# Patient Record
Sex: FEMALE | Race: WHITE | NOT HISPANIC OR LATINO | Employment: FULL TIME | URBAN - NONMETROPOLITAN AREA
[De-identification: names, ages, dates, MRNs, and addresses within clinical notes are randomized per-mention and may not be internally consistent; named-entity substitution may affect disease eponyms.]

---

## 2023-08-31 PROBLEM — J45.20 MILD INTERMITTENT ASTHMA WITHOUT COMPLICATION (HHS-HCC): Status: ACTIVE | Noted: 2023-08-31

## 2023-08-31 PROBLEM — I10 BENIGN HYPERTENSION: Status: ACTIVE | Noted: 2023-08-31

## 2023-08-31 PROBLEM — E66.9 OBESITY: Status: ACTIVE | Noted: 2023-08-31

## 2023-08-31 PROBLEM — G47.10 HYPERSOMNIA: Status: ACTIVE | Noted: 2023-08-31

## 2023-08-31 PROBLEM — I80.202: Status: ACTIVE | Noted: 2023-08-31

## 2023-08-31 PROBLEM — G47.19 EXCESSIVE DAYTIME SLEEPINESS: Status: ACTIVE | Noted: 2023-08-31

## 2023-08-31 PROBLEM — R13.10 DYSPHAGIA: Status: ACTIVE | Noted: 2023-08-31

## 2023-08-31 PROBLEM — R73.9 HYPERGLYCEMIA: Status: ACTIVE | Noted: 2023-08-31

## 2023-08-31 PROBLEM — F32.A DEPRESSION: Status: ACTIVE | Noted: 2023-08-31

## 2023-08-31 PROBLEM — M17.0 PRIMARY OSTEOARTHRITIS OF BOTH KNEES: Status: ACTIVE | Noted: 2023-08-31

## 2023-08-31 PROBLEM — E55.9 VITAMIN D DEFICIENCY: Status: ACTIVE | Noted: 2023-08-31

## 2023-08-31 PROBLEM — E78.5 HYPERLIPIDEMIA: Status: ACTIVE | Noted: 2023-08-31

## 2023-08-31 PROBLEM — K64.4 EXTERNAL HEMORRHOIDS WITH COMPLICATION: Status: ACTIVE | Noted: 2023-08-31

## 2023-08-31 PROBLEM — R92.8 ABNORMAL MAMMOGRAM: Status: ACTIVE | Noted: 2023-08-31

## 2023-08-31 PROBLEM — J45.909 ASTHMA WITHOUT STATUS ASTHMATICUS (HHS-HCC): Status: ACTIVE | Noted: 2023-08-31

## 2023-08-31 PROBLEM — D68.59 HYPERCOAGULABLE STATE (MULTI): Status: ACTIVE | Noted: 2023-08-31

## 2023-08-31 PROBLEM — Z98.890 HISTORY OF HEMORRHOIDECTOMY: Status: ACTIVE | Noted: 2023-08-31

## 2023-08-31 PROBLEM — K64.5 THROMBOSED EXTERNAL HEMORRHOID: Status: ACTIVE | Noted: 2023-08-31

## 2023-08-31 PROBLEM — K21.9 GASTROESOPHAGEAL REFLUX DISEASE WITHOUT ESOPHAGITIS: Status: ACTIVE | Noted: 2023-08-31

## 2023-08-31 PROBLEM — G25.81 RESTLESS LEG: Status: ACTIVE | Noted: 2023-08-31

## 2023-08-31 PROBLEM — G89.29 OTHER CHRONIC PAIN: Status: ACTIVE | Noted: 2023-08-31

## 2023-08-31 PROBLEM — M17.9 OSTEOARTHRITIS OF KNEE: Status: ACTIVE | Noted: 2023-08-31

## 2023-08-31 PROBLEM — S61.219A LACERATION OF FINGER: Status: ACTIVE | Noted: 2023-08-31

## 2023-08-31 PROBLEM — G47.33 OSA (OBSTRUCTIVE SLEEP APNEA): Status: ACTIVE | Noted: 2023-08-31

## 2023-08-31 PROBLEM — R89.9 ABNORMAL LABORATORY TEST RESULT: Status: ACTIVE | Noted: 2023-08-31

## 2023-08-31 PROBLEM — G47.8 SLEEP TALKING: Status: ACTIVE | Noted: 2023-08-31

## 2023-08-31 PROBLEM — R53.82 CHRONIC FATIGUE: Status: ACTIVE | Noted: 2023-08-31

## 2023-08-31 RX ORDER — DULOXETIN HYDROCHLORIDE 30 MG/1
1 CAPSULE, DELAYED RELEASE ORAL
COMMUNITY
Start: 2022-07-28

## 2023-08-31 RX ORDER — LANOLIN ALCOHOL/MO/W.PET/CERES
500 CREAM (GRAM) TOPICAL DAILY
COMMUNITY
End: 2024-03-19 | Stop reason: ALTCHOICE

## 2023-08-31 RX ORDER — FUROSEMIDE 20 MG/1
20 TABLET ORAL DAILY PRN
COMMUNITY
Start: 2022-10-25 | End: 2023-11-01

## 2023-08-31 RX ORDER — FAMOTIDINE 40 MG/1
40 TABLET, FILM COATED ORAL DAILY
COMMUNITY
Start: 2023-05-23 | End: 2024-04-24 | Stop reason: SDUPTHER

## 2023-08-31 RX ORDER — ROPINIROLE 1 MG/1
1 TABLET, FILM COATED ORAL 2 TIMES DAILY
COMMUNITY
End: 2024-05-07

## 2023-08-31 RX ORDER — ERGOCALCIFEROL 1.25 MG/1
50000 CAPSULE ORAL 2 TIMES WEEKLY
COMMUNITY
End: 2023-10-30

## 2023-08-31 RX ORDER — ALBUTEROL SULFATE 90 UG/1
2 AEROSOL, METERED RESPIRATORY (INHALATION) EVERY 4 HOURS PRN
COMMUNITY
End: 2023-11-01

## 2023-08-31 RX ORDER — ACETAMINOPHEN 500 MG
1000 TABLET ORAL 2 TIMES DAILY
COMMUNITY
Start: 2022-04-28

## 2023-08-31 RX ORDER — LOSARTAN POTASSIUM AND HYDROCHLOROTHIAZIDE 25; 100 MG/1; MG/1
1 TABLET ORAL DAILY
COMMUNITY
Start: 2022-08-23

## 2023-08-31 RX ORDER — CELECOXIB 200 MG/1
200 CAPSULE ORAL
COMMUNITY
Start: 2022-07-28 | End: 2024-03-21

## 2023-10-30 DIAGNOSIS — E55.9 VITAMIN D DEFICIENCY: ICD-10-CM

## 2023-10-30 RX ORDER — OMEPRAZOLE 40 MG/1
CAPSULE, DELAYED RELEASE ORAL
COMMUNITY
Start: 2023-01-30 | End: 2024-01-12 | Stop reason: SDUPTHER

## 2023-10-30 RX ORDER — HYDROCODONE BITARTRATE AND ACETAMINOPHEN 5; 325 MG/1; MG/1
TABLET ORAL
COMMUNITY
Start: 2022-12-05 | End: 2024-03-19 | Stop reason: ALTCHOICE

## 2023-10-30 RX ORDER — APIXABAN 5 MG/1
5 TABLET, FILM COATED ORAL 2 TIMES DAILY
COMMUNITY
Start: 2023-08-09

## 2023-10-30 RX ORDER — ERGOCALCIFEROL 1.25 MG/1
CAPSULE ORAL
Qty: 26 CAPSULE | Refills: 3 | Status: SHIPPED | OUTPATIENT
Start: 2023-10-30

## 2023-10-30 RX ORDER — CELECOXIB 100 MG/1
CAPSULE ORAL
COMMUNITY
Start: 2023-04-29 | End: 2024-03-19 | Stop reason: ALTCHOICE

## 2023-11-01 DIAGNOSIS — I10 BENIGN HYPERTENSION: Primary | ICD-10-CM

## 2023-11-01 DIAGNOSIS — J45.20 MILD INTERMITTENT ASTHMA, UNSPECIFIED WHETHER COMPLICATED (HHS-HCC): ICD-10-CM

## 2023-11-01 DIAGNOSIS — F32.A DEPRESSION, UNSPECIFIED DEPRESSION TYPE: ICD-10-CM

## 2023-11-01 RX ORDER — ALBUTEROL SULFATE 90 UG/1
2 AEROSOL, METERED RESPIRATORY (INHALATION) EVERY 4 HOURS PRN
Qty: 25.5 G | Refills: 3 | Status: SHIPPED | OUTPATIENT
Start: 2023-11-01

## 2023-11-01 RX ORDER — FLUOXETINE HYDROCHLORIDE 20 MG/1
20 CAPSULE ORAL 3 TIMES DAILY
Qty: 270 CAPSULE | Refills: 3 | Status: SHIPPED | OUTPATIENT
Start: 2023-11-01

## 2023-11-01 RX ORDER — FUROSEMIDE 20 MG/1
20 TABLET ORAL DAILY PRN
Qty: 90 TABLET | Refills: 3 | Status: SHIPPED | OUTPATIENT
Start: 2023-11-01

## 2023-11-28 ENCOUNTER — APPOINTMENT (OUTPATIENT)
Dept: PRIMARY CARE | Facility: CLINIC | Age: 63
End: 2023-11-28
Payer: COMMERCIAL

## 2023-12-26 ENCOUNTER — APPOINTMENT (OUTPATIENT)
Dept: PRIMARY CARE | Facility: CLINIC | Age: 63
End: 2023-12-26
Payer: COMMERCIAL

## 2024-01-12 DIAGNOSIS — K21.9 GASTROESOPHAGEAL REFLUX DISEASE WITHOUT ESOPHAGITIS: ICD-10-CM

## 2024-01-12 RX ORDER — OMEPRAZOLE 40 MG/1
CAPSULE, DELAYED RELEASE ORAL
Qty: 90 CAPSULE | Refills: 3 | Status: SHIPPED | OUTPATIENT
Start: 2024-01-12 | End: 2024-05-29 | Stop reason: WASHOUT

## 2024-03-19 ENCOUNTER — OFFICE VISIT (OUTPATIENT)
Dept: PRIMARY CARE | Facility: CLINIC | Age: 64
End: 2024-03-19
Payer: COMMERCIAL

## 2024-03-19 VITALS
HEIGHT: 64 IN | SYSTOLIC BLOOD PRESSURE: 130 MMHG | OXYGEN SATURATION: 99 % | HEART RATE: 95 BPM | DIASTOLIC BLOOD PRESSURE: 88 MMHG | BODY MASS INDEX: 50.02 KG/M2 | TEMPERATURE: 98.1 F | WEIGHT: 293 LBS

## 2024-03-19 DIAGNOSIS — R73.9 HYPERGLYCEMIA: ICD-10-CM

## 2024-03-19 DIAGNOSIS — Z12.31 BREAST CANCER SCREENING BY MAMMOGRAM: ICD-10-CM

## 2024-03-19 DIAGNOSIS — E66.01 MORBID OBESITY WITH BMI OF 50.0-59.9, ADULT (MULTI): Primary | ICD-10-CM

## 2024-03-19 LAB — POC HEMOGLOBIN A1C: 5.7 % (ref 4.2–6.5)

## 2024-03-19 PROCEDURE — 1036F TOBACCO NON-USER: CPT | Performed by: FAMILY MEDICINE

## 2024-03-19 PROCEDURE — 83036 HEMOGLOBIN GLYCOSYLATED A1C: CPT | Performed by: FAMILY MEDICINE

## 2024-03-19 PROCEDURE — 3079F DIAST BP 80-89 MM HG: CPT | Performed by: FAMILY MEDICINE

## 2024-03-19 PROCEDURE — 3075F SYST BP GE 130 - 139MM HG: CPT | Performed by: FAMILY MEDICINE

## 2024-03-19 PROCEDURE — 3008F BODY MASS INDEX DOCD: CPT | Performed by: FAMILY MEDICINE

## 2024-03-19 PROCEDURE — 99214 OFFICE O/P EST MOD 30 MIN: CPT | Performed by: FAMILY MEDICINE

## 2024-03-19 RX ORDER — TIRZEPATIDE 5 MG/.5ML
5 INJECTION, SOLUTION SUBCUTANEOUS
Qty: 2 ML | Refills: 3 | Status: SHIPPED | OUTPATIENT
Start: 2024-03-19 | End: 2024-05-29

## 2024-03-19 ASSESSMENT — PATIENT HEALTH QUESTIONNAIRE - PHQ9
1. LITTLE INTEREST OR PLEASURE IN DOING THINGS: NOT AT ALL
SUM OF ALL RESPONSES TO PHQ9 QUESTIONS 1 AND 2: 0
2. FEELING DOWN, DEPRESSED OR HOPELESS: NOT AT ALL

## 2024-03-19 ASSESSMENT — ENCOUNTER SYMPTOMS
CONFUSION: 0
AGITATION: 0
APPETITE CHANGE: 1
FATIGUE: 1
DYSPHORIC MOOD: 0

## 2024-03-19 ASSESSMENT — PAIN SCALES - GENERAL: PAINLEVEL: 10-WORST PAIN EVER

## 2024-03-19 ASSESSMENT — LIFESTYLE VARIABLES: HOW OFTEN DO YOU HAVE A DRINK CONTAINING ALCOHOL: NEVER

## 2024-03-19 NOTE — PROGRESS NOTES
"Subjective   Patient ID: Yazmin Slade is a 63 y.o. female who presents for would like to discuss gastric bypass options.    HPI wants to discuss surgery options, has clotting disorder.     Review of Systems   Constitutional:  Positive for appetite change and fatigue.   Psychiatric/Behavioral:  Negative for agitation, confusion and dysphoric mood.        Objective   /88   Pulse 95   Temp 36.7 °C (98.1 °F)   Ht 1.626 m (5' 4\")   Wt 138 kg (305 lb)   SpO2 99%   BMI 52.35 kg/m²     Physical Exam  Constitutional:       General: She is not in acute distress.     Appearance: Normal appearance.   HENT:      Head: Normocephalic.      Mouth/Throat:      Mouth: Mucous membranes are moist.      Pharynx: Oropharynx is clear.   Eyes:      Extraocular Movements: Extraocular movements intact.      Pupils: Pupils are equal, round, and reactive to light.   Cardiovascular:      Rate and Rhythm: Normal rate and regular rhythm.      Heart sounds: Normal heart sounds.   Pulmonary:      Effort: Pulmonary effort is normal.      Breath sounds: Normal breath sounds. No wheezing or rhonchi.   Abdominal:      General: There is no distension.      Palpations: Abdomen is soft.      Tenderness: There is no abdominal tenderness.   Musculoskeletal:      Right lower leg: No edema.      Left lower leg: No edema.   Lymphadenopathy:      Cervical: No cervical adenopathy.   Skin:     Coloration: Skin is not jaundiced.   Neurological:      General: No focal deficit present.      Mental Status: She is alert.      Cranial Nerves: No cranial nerve deficit.   Psychiatric:         Mood and Affect: Mood normal.         Assessment/Plan   Problem List Items Addressed This Visit             ICD-10-CM    Hyperglycemia R73.9    Relevant Medications    tirzepatide (Mounjaro) 5 mg/0.5 mL pen injector    Other Relevant Orders    POCT glycosylated hemoglobin (Hb A1C) manually resulted (Completed)    Referral to General Surgery     Other Visit Diagnoses  "        Codes    Morbid obesity with BMI of 50.0-59.9, adult (CMS/HCC)    -  Primary E66.01, Z68.43    Relevant Medications    tirzepatide (Mounjaro) 5 mg/0.5 mL pen injector    Other Relevant Orders    POCT glycosylated hemoglobin (Hb A1C) manually resulted (Completed)    Referral to General Surgery    Breast cancer screening by mammogram     Z12.31    Relevant Orders    BI mammo bilateral screening tomosynthesis          Follow up as scheduled for physical- trial of med assist for weight loss

## 2024-03-20 DIAGNOSIS — M19.90 OSTEOARTHRITIS, UNSPECIFIED OSTEOARTHRITIS TYPE, UNSPECIFIED SITE: ICD-10-CM

## 2024-03-21 RX ORDER — CELECOXIB 200 MG/1
200 CAPSULE ORAL 2 TIMES DAILY
Qty: 180 CAPSULE | Refills: 3 | Status: SHIPPED | OUTPATIENT
Start: 2024-03-21 | End: 2025-03-21

## 2024-03-21 RX ORDER — CELECOXIB 200 MG/1
CAPSULE ORAL
Refills: 0 | OUTPATIENT
Start: 2024-03-21

## 2024-03-26 ENCOUNTER — OFFICE VISIT (OUTPATIENT)
Dept: PRIMARY CARE | Facility: CLINIC | Age: 64
End: 2024-03-26
Payer: COMMERCIAL

## 2024-03-26 VITALS
HEART RATE: 103 BPM | DIASTOLIC BLOOD PRESSURE: 86 MMHG | SYSTOLIC BLOOD PRESSURE: 138 MMHG | HEIGHT: 64 IN | WEIGHT: 293 LBS | TEMPERATURE: 98.3 F | OXYGEN SATURATION: 98 % | BODY MASS INDEX: 50.02 KG/M2

## 2024-03-26 DIAGNOSIS — E66.01 CLASS 3 SEVERE OBESITY DUE TO EXCESS CALORIES WITH SERIOUS COMORBIDITY AND BODY MASS INDEX (BMI) OF 50.0 TO 59.9 IN ADULT (MULTI): ICD-10-CM

## 2024-03-26 DIAGNOSIS — K21.9 GASTROESOPHAGEAL REFLUX DISEASE WITHOUT ESOPHAGITIS: ICD-10-CM

## 2024-03-26 DIAGNOSIS — Z00.00 ROUTINE GENERAL MEDICAL EXAMINATION AT A HEALTH CARE FACILITY: Primary | ICD-10-CM

## 2024-03-26 DIAGNOSIS — E78.2 MIXED HYPERLIPIDEMIA: ICD-10-CM

## 2024-03-26 DIAGNOSIS — G62.9 NEUROPATHY: ICD-10-CM

## 2024-03-26 DIAGNOSIS — R53.82 CHRONIC FATIGUE: ICD-10-CM

## 2024-03-26 DIAGNOSIS — M25.50 POLYARTHRALGIA: ICD-10-CM

## 2024-03-26 DIAGNOSIS — E55.9 VITAMIN D DEFICIENCY: ICD-10-CM

## 2024-03-26 DIAGNOSIS — I10 BENIGN HYPERTENSION: ICD-10-CM

## 2024-03-26 LAB
ALBUMIN SERPL BCP-MCNC: 3.5 G/DL (ref 3.4–5)
ALP SERPL-CCNC: 120 U/L (ref 33–136)
ALT SERPL W P-5'-P-CCNC: 21 U/L (ref 7–45)
ANION GAP SERPL CALC-SCNC: 14 MMOL/L (ref 10–20)
AST SERPL W P-5'-P-CCNC: 18 U/L (ref 9–39)
BASOPHILS # BLD AUTO: 0.07 X10*3/UL (ref 0–0.1)
BASOPHILS NFR BLD AUTO: 0.7 %
BILIRUB SERPL-MCNC: 0.3 MG/DL (ref 0–1.2)
BUN SERPL-MCNC: 20 MG/DL (ref 6–23)
CALCIUM SERPL-MCNC: 8.7 MG/DL (ref 8.6–10.3)
CHLORIDE SERPL-SCNC: 102 MMOL/L (ref 98–107)
CHOLEST SERPL-MCNC: 139 MG/DL (ref 0–199)
CHOLESTEROL/HDL RATIO: 3.9
CO2 SERPL-SCNC: 32 MMOL/L (ref 21–32)
CREAT SERPL-MCNC: 1.15 MG/DL (ref 0.5–1.05)
EGFRCR SERPLBLD CKD-EPI 2021: 54 ML/MIN/1.73M*2
EOSINOPHIL # BLD AUTO: 0.35 X10*3/UL (ref 0–0.7)
EOSINOPHIL NFR BLD AUTO: 3.7 %
ERYTHROCYTE [DISTWIDTH] IN BLOOD BY AUTOMATED COUNT: 14.5 % (ref 11.5–14.5)
GLUCOSE SERPL-MCNC: 113 MG/DL (ref 74–99)
HCT VFR BLD AUTO: 42 % (ref 36–46)
HDLC SERPL-MCNC: 35.4 MG/DL
HGB BLD-MCNC: 13.3 G/DL (ref 12–16)
IMM GRANULOCYTES # BLD AUTO: 0.04 X10*3/UL (ref 0–0.7)
IMM GRANULOCYTES NFR BLD AUTO: 0.4 % (ref 0–0.9)
LDLC SERPL CALC-MCNC: 74 MG/DL
LYMPHOCYTES # BLD AUTO: 2.32 X10*3/UL (ref 1.2–4.8)
LYMPHOCYTES NFR BLD AUTO: 24.7 %
MCH RBC QN AUTO: 26.9 PG (ref 26–34)
MCHC RBC AUTO-ENTMCNC: 31.7 G/DL (ref 32–36)
MCV RBC AUTO: 85 FL (ref 80–100)
MONOCYTES # BLD AUTO: 0.63 X10*3/UL (ref 0.1–1)
MONOCYTES NFR BLD AUTO: 6.7 %
NEUTROPHILS # BLD AUTO: 5.99 X10*3/UL (ref 1.2–7.7)
NEUTROPHILS NFR BLD AUTO: 63.8 %
NON HDL CHOLESTEROL: 104 MG/DL (ref 0–149)
NRBC BLD-RTO: 0 /100 WBCS (ref 0–0)
PLATELET # BLD AUTO: 322 X10*3/UL (ref 150–450)
POTASSIUM SERPL-SCNC: 3.6 MMOL/L (ref 3.5–5.3)
PROT SERPL-MCNC: 7.1 G/DL (ref 6.4–8.2)
RBC # BLD AUTO: 4.94 X10*6/UL (ref 4–5.2)
SODIUM SERPL-SCNC: 144 MMOL/L (ref 136–145)
T4 FREE SERPL-MCNC: 0.87 NG/DL (ref 0.61–1.12)
TRIGL SERPL-MCNC: 147 MG/DL (ref 0–149)
TSH SERPL-ACNC: 5.59 MIU/L (ref 0.44–3.98)
URATE SERPL-MCNC: 6.1 MG/DL (ref 2.3–6.7)
VLDL: 29 MG/DL (ref 0–40)
WBC # BLD AUTO: 9.4 X10*3/UL (ref 4.4–11.3)

## 2024-03-26 PROCEDURE — 84439 ASSAY OF FREE THYROXINE: CPT

## 2024-03-26 PROCEDURE — 1036F TOBACCO NON-USER: CPT | Performed by: FAMILY MEDICINE

## 2024-03-26 PROCEDURE — 80061 LIPID PANEL: CPT

## 2024-03-26 PROCEDURE — 82607 VITAMIN B-12: CPT

## 2024-03-26 PROCEDURE — 3079F DIAST BP 80-89 MM HG: CPT | Performed by: FAMILY MEDICINE

## 2024-03-26 PROCEDURE — 36415 COLL VENOUS BLD VENIPUNCTURE: CPT

## 2024-03-26 PROCEDURE — 3008F BODY MASS INDEX DOCD: CPT | Performed by: FAMILY MEDICINE

## 2024-03-26 PROCEDURE — 82746 ASSAY OF FOLIC ACID SERUM: CPT

## 2024-03-26 PROCEDURE — 82306 VITAMIN D 25 HYDROXY: CPT

## 2024-03-26 PROCEDURE — 99396 PREV VISIT EST AGE 40-64: CPT | Performed by: FAMILY MEDICINE

## 2024-03-26 PROCEDURE — 84443 ASSAY THYROID STIM HORMONE: CPT

## 2024-03-26 PROCEDURE — 84481 FREE ASSAY (FT-3): CPT

## 2024-03-26 PROCEDURE — 85025 COMPLETE CBC W/AUTO DIFF WBC: CPT

## 2024-03-26 PROCEDURE — 3075F SYST BP GE 130 - 139MM HG: CPT | Performed by: FAMILY MEDICINE

## 2024-03-26 PROCEDURE — 80053 COMPREHEN METABOLIC PANEL: CPT

## 2024-03-26 PROCEDURE — 84550 ASSAY OF BLOOD/URIC ACID: CPT

## 2024-03-26 ASSESSMENT — ENCOUNTER SYMPTOMS
BRUISES/BLEEDS EASILY: 0
JOINT SWELLING: 0
VOMITING: 0
NAUSEA: 0
ARTHRALGIAS: 0
COUGH: 0
DIARRHEA: 0
NERVOUS/ANXIOUS: 0
ABDOMINAL PAIN: 0
HEMATURIA: 0
SHORTNESS OF BREATH: 0
FEVER: 0
WHEEZING: 0
CHILLS: 0
SORE THROAT: 0
HEADACHES: 0
DYSPHORIC MOOD: 0
DYSURIA: 0
CONSTIPATION: 0
FATIGUE: 0
DIZZINESS: 0
PALPITATIONS: 0
FREQUENCY: 0

## 2024-03-26 ASSESSMENT — PATIENT HEALTH QUESTIONNAIRE - PHQ9
SUM OF ALL RESPONSES TO PHQ9 QUESTIONS 1 AND 2: 0
1. LITTLE INTEREST OR PLEASURE IN DOING THINGS: NOT AT ALL
2. FEELING DOWN, DEPRESSED OR HOPELESS: NOT AT ALL

## 2024-03-26 ASSESSMENT — PAIN SCALES - GENERAL: PAINLEVEL: 10-WORST PAIN EVER

## 2024-03-26 ASSESSMENT — LIFESTYLE VARIABLES: HOW OFTEN DO YOU HAVE A DRINK CONTAINING ALCOHOL: NEVER

## 2024-03-26 NOTE — PROGRESS NOTES
"Subjective   Patient ID: Yazmin Slade is a 63 y.o. female who presents for Annual Exam.    HPI here for annual exam, on eliquis and not coumadin, labs due. She has numbness and swelling in legs and hands. Mounjaro approved per PA listing.     Review of Systems   Constitutional:  Negative for chills, fatigue and fever.   HENT:  Negative for congestion, ear pain, hearing loss, postnasal drip, sore throat and tinnitus.    Eyes:  Negative for visual disturbance.   Respiratory:  Negative for cough, shortness of breath and wheezing.    Cardiovascular:  Negative for chest pain and palpitations.   Gastrointestinal:  Negative for abdominal pain, constipation, diarrhea, nausea and vomiting.   Endocrine: Negative for polyuria.   Genitourinary:  Negative for dysuria, frequency, hematuria and urgency.   Musculoskeletal:  Negative for arthralgias, gait problem and joint swelling.   Skin:  Negative for rash.   Neurological:  Negative for dizziness, syncope and headaches.   Hematological:  Does not bruise/bleed easily.   Psychiatric/Behavioral:  Negative for dysphoric mood. The patient is not nervous/anxious.        Objective   /86   Pulse 103   Temp 36.8 °C (98.3 °F)   Ht 1.626 m (5' 4\")   Wt 138 kg (305 lb)   SpO2 98%   BMI 52.35 kg/m²     Physical Exam  Vitals reviewed.   Constitutional:       General: She is not in acute distress.     Appearance: Normal appearance.   HENT:      Head: Normocephalic.      Right Ear: Tympanic membrane, ear canal and external ear normal.      Left Ear: Tympanic membrane, ear canal and external ear normal.      Nose: Nose normal.      Mouth/Throat:      Mouth: Mucous membranes are moist.      Pharynx: Oropharynx is clear.   Eyes:      Extraocular Movements: Extraocular movements intact.      Conjunctiva/sclera: Conjunctivae normal.      Pupils: Pupils are equal, round, and reactive to light.   Cardiovascular:      Rate and Rhythm: Normal rate and regular rhythm.      Pulses: Normal " pulses.      Heart sounds: No murmur heard.  Pulmonary:      Effort: Pulmonary effort is normal.      Breath sounds: Normal breath sounds. No wheezing or rhonchi.   Abdominal:      General: Bowel sounds are normal. There is no distension.      Palpations: Abdomen is soft.      Tenderness: There is no abdominal tenderness. There is no rebound.   Musculoskeletal:      Right lower leg: No edema.      Left lower leg: No edema.   Skin:     General: Skin is warm.      Coloration: Skin is not jaundiced.   Neurological:      General: No focal deficit present.      Mental Status: She is alert and oriented to person, place, and time.      Cranial Nerves: No cranial nerve deficit.      Gait: Gait normal.   Psychiatric:         Mood and Affect: Mood normal.         Assessment/Plan   Problem List Items Addressed This Visit             ICD-10-CM    Benign hypertension I10    Chronic fatigue R53.82    Gastroesophageal reflux disease without esophagitis K21.9    Hyperlipidemia E78.5    Obesity E66.9    Vitamin D deficiency E55.9     Other Visit Diagnoses         Codes    Routine general medical examination at a health care facility    -  Primary Z00.00    Neuropathy     G62.9    Polyarthralgia     M25.50        Reviewed low-fat low-cholesterol low-salt diet choices as well as encouraged regular physical activity for 30 minutes 4 times a week.  Mammogram ordered. Colonoscopy up-to-date.  Immunizations up-to-date.  Reviewed age-appropriate screening recommendations.  Laboratory studies ordered.      To  mounjaro which is available at pharmacy per computer

## 2024-03-27 LAB
25(OH)D3 SERPL-MCNC: 73 NG/ML (ref 30–100)
FOLATE SERPL-MCNC: 6.4 NG/ML
T3FREE SERPL-MCNC: 3.5 PG/ML (ref 2.3–4.2)
VIT B12 SERPL-MCNC: 353 PG/ML (ref 211–911)

## 2024-04-01 ENCOUNTER — TELEPHONE (OUTPATIENT)
Dept: PRIMARY CARE | Facility: CLINIC | Age: 64
End: 2024-04-01
Payer: COMMERCIAL

## 2024-04-01 DIAGNOSIS — R13.10 DYSPHAGIA, UNSPECIFIED TYPE: Primary | ICD-10-CM

## 2024-04-01 RX ORDER — ONDANSETRON 4 MG/1
4 TABLET, FILM COATED ORAL EVERY 8 HOURS PRN
Qty: 30 TABLET | Refills: 0 | Status: SHIPPED | OUTPATIENT
Start: 2024-04-01 | End: 2024-05-31

## 2024-04-01 NOTE — TELEPHONE ENCOUNTER
Pt called and stated that she has been having a lot of gas and nausea since taking the mounjaro. She is wondering if you can call her in something. [facility] in [place].

## 2024-04-24 DIAGNOSIS — K21.9 GASTROESOPHAGEAL REFLUX DISEASE WITHOUT ESOPHAGITIS: Primary | ICD-10-CM

## 2024-04-24 RX ORDER — FAMOTIDINE 40 MG/1
40 TABLET, FILM COATED ORAL 2 TIMES DAILY
Qty: 180 TABLET | Refills: 3 | Status: SHIPPED | OUTPATIENT
Start: 2024-04-24

## 2024-05-07 DIAGNOSIS — G25.81 RESTLESS LEG: Primary | ICD-10-CM

## 2024-05-07 RX ORDER — ROPINIROLE 1 MG/1
1 TABLET, FILM COATED ORAL 2 TIMES DAILY
Qty: 180 TABLET | Refills: 3 | Status: SHIPPED | OUTPATIENT
Start: 2024-05-07

## 2024-05-14 NOTE — PROGRESS NOTES
Subjective   Patient ID: Yazmin Slade is a 63 y.o. female who presents for No chief complaint on file..  HPI  BARIATRIC CONSULT  START WT: 284   IDEAL WT:  137 START EXCESS: 147  HT: 62.5 IN  YRS OF OBESITY:43  GREATEST WT LOSS IN LBS: 25  PROGRAMS FOR WT LOSS IN THE PAST: INJECTIONS FOR WT LOSS, RAMO MENON   WORKS as    Review of Systems     Allergy/Immunologic:          HIV / AIDS No.  Hepatitis A No.  Hepatitis B No.  Hepatitis C No.  Immunosuppressent drugs yes         HEENT:          Headache negative.    ADMITS TO FATIGUE     CARDIOLOGY:          History of Hyperlipidemia No.  Last stress test N/A.  Last echocardiogram N/A.  Chest pain No.  High blood pressure yes.  Irregular heart beat No.  Known coronary artery disease No.  Pacemaker No.  Palpitations No.         RESPIRATORY:          Hx steroid use yes  ER visits or Hospitalizations for breathing problems No.  Sleep Apnea yes, cpap , daytime sleepiness AGUILAR (dyspnea on exertion) No.  Hx of Asthma/COPD: YES      GASTROENTEROLOGY:          Peptic ulcer No, Last EGD N/A, Last UGI N/A.  Colonoscopy Last Colonoscopy 2020  Heartburn  yes. Admits to nausea         ENDOCRINOLOGY:          Diabetes No.  Thyroid disorder No.         EXTREMITIES:          Varicose Veins No.  Stasis Ulcers No.  Ankle swelling YES  Personal history DVT yes  Personal history PE .no  Personal history of other thrombolic events No.  Family history of VTE No.  Known genetic bleeding or clotting disorder No.         FEMALE REPRODUCTIVE:          Uterine fibroids No.  Ovarian Cyst No.  Infertility No.  Menstrual history Menopausal.         UROLOGY:          Kidney disease No.  Kidney stones No.  Previous UTIs No.  Urinary incontinence No.         MUSCULOSKELETAL:          Osteoporosis/Osteopenia No.  Arthritis OSTEOARTHRITIS Joint pain YES         SKIN:          Hidradenitis No.  Open skin wounds No.  Rosacea No.  Healing problems No.         PSYCHOLOGY:           Anxiety YES   Depression none.  Eating disorder denies.    Objective   Physical Exam    Assessment/Plan            Rebekah Morrell LPN 05/14/24 12:36 PM

## 2024-05-17 ENCOUNTER — OFFICE VISIT (OUTPATIENT)
Dept: SURGERY | Facility: CLINIC | Age: 64
End: 2024-05-17
Payer: COMMERCIAL

## 2024-05-17 VITALS
SYSTOLIC BLOOD PRESSURE: 163 MMHG | BODY MASS INDEX: 50.32 KG/M2 | HEART RATE: 116 BPM | HEIGHT: 63 IN | DIASTOLIC BLOOD PRESSURE: 98 MMHG | WEIGHT: 284 LBS

## 2024-05-17 DIAGNOSIS — R10.11 RUQ PAIN: ICD-10-CM

## 2024-05-17 DIAGNOSIS — R73.9 HYPERGLYCEMIA: ICD-10-CM

## 2024-05-17 DIAGNOSIS — D68.59 HYPERCOAGULABLE STATE (MULTI): ICD-10-CM

## 2024-05-17 DIAGNOSIS — E78.49 OTHER HYPERLIPIDEMIA: ICD-10-CM

## 2024-05-17 DIAGNOSIS — E66.01 MORBID OBESITY WITH BMI OF 50.0-59.9, ADULT (MULTI): Primary | ICD-10-CM

## 2024-05-17 DIAGNOSIS — I10 BENIGN HYPERTENSION: ICD-10-CM

## 2024-05-17 DIAGNOSIS — G47.33 OSA (OBSTRUCTIVE SLEEP APNEA): ICD-10-CM

## 2024-05-17 PROCEDURE — 3077F SYST BP >= 140 MM HG: CPT | Performed by: SURGERY

## 2024-05-17 PROCEDURE — 3008F BODY MASS INDEX DOCD: CPT | Performed by: SURGERY

## 2024-05-17 PROCEDURE — 3080F DIAST BP >= 90 MM HG: CPT | Performed by: SURGERY

## 2024-05-17 PROCEDURE — 99204 OFFICE O/P NEW MOD 45 MIN: CPT | Performed by: SURGERY

## 2024-05-17 ASSESSMENT — PAIN SCALES - GENERAL: PAINLEVEL: 0-NO PAIN

## 2024-05-17 ASSESSMENT — COLUMBIA-SUICIDE SEVERITY RATING SCALE - C-SSRS
1. IN THE PAST MONTH, HAVE YOU WISHED YOU WERE DEAD OR WISHED YOU COULD GO TO SLEEP AND NOT WAKE UP?: NO
2. HAVE YOU ACTUALLY HAD ANY THOUGHTS OF KILLING YOURSELF?: NO
6. HAVE YOU EVER DONE ANYTHING, STARTED TO DO ANYTHING, OR PREPARED TO DO ANYTHING TO END YOUR LIFE?: NO

## 2024-05-21 PROBLEM — E66.01 MORBID OBESITY WITH BMI OF 50.0-59.9, ADULT (MULTI): Status: ACTIVE | Noted: 2024-05-21

## 2024-05-21 NOTE — H&P
History Of Present Illness  Yazmin Slade is a 64 y.o. woman here for consultation for bariatric surgery. She suffers from morbid obesity and has been overweight for many years and has a number of weight related comorbidities. She has attempted to lose weight several times over the years. She has had successes but has regained the weight at the completion of each of her dieting attempts. She is being referred for surgical intervention for her clinically significant morbid obesity.     -Bariatric Consultation:   START WT: 284   IDEAL WT:  137 START EXCESS: 147  HT: 62.5 IN  YRS OF OBESITY:43  GREATEST WT LOSS IN LBS: 25  PROGRAMS FOR WT LOSS IN THE PAST: INJECTIONS FOR WT LOSS, WW, ATKINS     Past Medical History  Past Medical History:   Diagnosis Date    Acid reflux     Asthma (HHS-HCC)     HTN (hypertension)     Knee pain     Obesity     Sleep apnea        Surgical History  Past Surgical History:   Procedure Laterality Date    BREAST SURGERY      breast reduction    CATARACT EXTRACTION      KNEE      TONSILLECTOMY          Social History  She reports that she has never smoked. She has never used smokeless tobacco. She reports that she does not currently use alcohol. She reports that she does not currently use drugs after having used the following drugs: Amphetamines.    Family History  Family History   Problem Relation Name Age of Onset    Hypertension Mother      Lung cancer Father      Other (HTN) Sister      Epilepsy Brother          Allergies  Erythromycin    Review of Systems    Allergy/Immunologic:          HIV / AIDS No.  Hepatitis A No.  Hepatitis B No.  Hepatitis C No.  Immunosuppressent drugs yes         HEENT:          Headache negative.    ADMITS TO FATIGUE     CARDIOLOGY:          History of Hyperlipidemia No.  Last stress test N/A.  Last echocardiogram N/A.  Chest pain No.  High blood pressure yes.  Irregular heart beat No.  Known coronary artery disease No.  Pacemaker No.  Palpitations No.          RESPIRATORY:          Hx steroid use yes  ER visits or Hospitalizations for breathing problems No.  Sleep Apnea yes, cpap , daytime sleepiness AGUILAR (dyspnea on exertion) No.  Hx of Asthma/COPD: YES      GASTROENTEROLOGY:          Peptic ulcer No, Last EGD N/A, Last UGI N/A.  Colonoscopy Last Colonoscopy 2020  Heartburn  yes. Admits to nausea         ENDOCRINOLOGY:          Diabetes No.  Thyroid disorder No.         EXTREMITIES:          Varicose Veins No.  Stasis Ulcers No.  Ankle swelling YES  Personal history DVT yes  Personal history PE .no  Personal history of other thrombolic events No.  Family history of VTE No.  Known genetic bleeding or clotting disorder No.         FEMALE REPRODUCTIVE:          Uterine fibroids No.  Ovarian Cyst No.  Infertility No.  Menstrual history Menopausal.         UROLOGY:          Kidney disease No.  Kidney stones No.  Previous UTIs No.  Urinary incontinence No.         MUSCULOSKELETAL:          Osteoporosis/Osteopenia No.  Arthritis OSTEOARTHRITIS Joint pain YES         SKIN:          Hidradenitis No.  Open skin wounds No.  Rosacea No.  Healing problems No.         PSYCHOLOGY:          Anxiety YES   Depression none.  Eating disorder denies.       Physical Exam       General Examination:         GENERAL APPEARANCE: alert and oriented x 3, Pleasant and cooperative, No Acute Distress.          HEENT: PERRLA.          NECK: no lymphadenopathy, no thyromegaly, no JVD, normal flexion, normal extension.          HEART: regular rate and rhythm.          LUNGS: clear to auscultation bilaterally.          CHEST: normal shape and expansion.          ABDOMEN: obese, no hernias present, soft and not tender, no guarding, no CVA tenderness.          EXTREMITIES: trace bilateral edema, no ulcerations.          SKIN: normal, no rash.          NEUROLOGIC EXAM: CN's II-XII grossly intact, gait normal.          BACK: no CVA tenderness.       Last Recorded Vitals  Blood pressure (!) 163/98, pulse (!)  "116, height 1.588 m (5' 2.5\"), weight 129 kg (284 lb).       Assessment/Plan   Problem List Items Addressed This Visit             ICD-10-CM    Benign hypertension I10    Hypercoagulable state (Multi) D68.59    Hyperglycemia R73.9    Hyperlipidemia E78.5    LOYDA (obstructive sleep apnea) G47.33    Morbid obesity with BMI of 50.0-59.9, adult (Multi) - Primary E66.01, Z68.43     Other Visit Diagnoses         Codes    RUQ pain     R10.11    Relevant Orders    US right upper quadrant            We spent 45 minutes reviewing the three surgical options available in the treatment of morbid obesity in our practice. We reviewed the laparoscopic approach to the Eli-en-Y gastric bypass, the vertical sleeve gastrectomy, and the adjustable gastric band. We reviewed the surgical technique, the risks, and my complication rates. We also reviewed the expected weight loss, the rules necessary for yonathan-term success, the nutritional supplementation recommended for each operation, and the importance of incorporating excercise into the lifestyle to maintain the weight. We reviewed both the national history with each operation, my experience with each operation, the lack of long-term weight loss data with the vertical sleeve gastrectomy and the potential for exacerbating reflux with the vertical sleeve gastrectomy. In addition, we discussed the risk of adhesions, internal hernias, iron and calcium deficiency, dumping syndrome and potential for gastrojejunal ulcer with the RYGB. Yazmin is going to think more about the two operations and let me know if she has additional questions or let me know which operation she would like to proceed with.  We discussed that RYGB would be more reliable treatment for GERD than VSG.  Her symptoms may be secondary to biliary colic rather than GERD so will obtain right upper quadrant ultrasound.  She is on lifelong anticoagulation due to history of DVT and family history of PE.  We discussed holding " anticoagulation for surgery and then having a lovenox bridge back to her anticoagulation postoperatively.  I explained that this history puts her at both increased risk of VTE and bleeding perioperatively.       Jacob Kent MD

## 2024-05-25 ENCOUNTER — HOSPITAL ENCOUNTER (OUTPATIENT)
Dept: RADIOLOGY | Facility: HOSPITAL | Age: 64
Discharge: HOME | End: 2024-05-25
Payer: COMMERCIAL

## 2024-05-25 DIAGNOSIS — R10.11 RUQ PAIN: ICD-10-CM

## 2024-05-25 PROCEDURE — 76705 ECHO EXAM OF ABDOMEN: CPT

## 2024-05-25 PROCEDURE — 76705 ECHO EXAM OF ABDOMEN: CPT | Performed by: RADIOLOGY

## 2024-05-28 NOTE — PROGRESS NOTES
Initial Medical Weight Loss Appointment ([facility] 1)     Starting Weight: 284 lb  Current Weight: 290 lb   Current BMI: 52.20    Diet Experience: Yazmin reports that she has felt big her entire life. In the past, she reports that she tried diets such as the [place] diet, a no carb diet, a high protein diet. She reports she would see success for a while, but then stop following the diets due to a lack of will. She reports that ~6 weeks ago she began to take Mounjaro and lost 21# (starting wt 308#) but then ran out of the medication. She is currently not on any weight loss medications.   Medical hx: acid reflux for the past 3 years. Also notes that her knees are 'bone on bone'.   Pt Seeking: sleeve   Pertinent Co-morbidities: high blood pressure, sleep apnea. Uses CPAP consistently per pt.   Current Daily Intake:   Breakfast- 1 cup of dried cherrios   Snack: pudding/Jello   Lunch- usually leftovers from dinner   Dinner- a hamburger and noodles, OR spaghetti and meatballs, OR mac&cheese and 2 hot dogs   Pt notes that her household seeks low cost foods.   How many times do you order takeout, fast food and/or go out to eat per week? Maybe 2x/month  Snacks: Jello, pudding, celery with Syriac dressing, peppers, cauliflower, crackers and tuna fish   Beverages: coffee with cream, 1 soda in the evening, water   Alcohol Intake: none   Food Allergies? NKFAS   Food Intolerances? Red sauces, greasy foods.   Food Dislikes? N/A  Do you eat when you are not hungry and/or when you feel full? Yes   Do you eat when you are bored/stressed/emotional?  Yes, stress eating.   Current Exercise/Exercise Hx: none currently. Exercise hx includes yard work.   Hours of sleep per night: ~8 (8 pm to 4:30 am). Pt notes she is up to urinate frequently throughout the night.   Work schedule: 6am to 3pm, Monday through Friday.   Who is in the household? Herself, her wife, her daughter and 3 children.   Who does the cooking/grocery shopping? Her wife  primarily does both.   What do you want to get out of surgery? Be able to get knee replacement surgery after losing weight.   Motivation to change (1-10): 10     Estimated ability to achieve goals: good.     Today's Lesson: Review of Dr. Kent's lifelong rules, calorie counting, food label reading, promoting feelings of satiety, and energy balance.  Diet Goals: Practice the lifelong rules  Exercise Recommendations: Gradually work up to 150 minutes of moderate intensity exercise per week.  Behavior Changes: will be assessed every month  Behavior Goals:  1. Decrease starch intake (keep to 1/2 cup per meal)  2. Decrease liquid calories (soda and sweet tea)  3. Incorporate chair exercises 1-2x/week.     Plan: Will follow up next month. Will continue to monitor pt's weight, dietary & behavior changes.            Gricel Fried RD, LDN  Registered Dietitian, Licensed Dietitian Nutritionist

## 2024-05-29 ENCOUNTER — OFFICE VISIT (OUTPATIENT)
Dept: SURGERY | Facility: CLINIC | Age: 64
End: 2024-05-29
Payer: COMMERCIAL

## 2024-05-29 ENCOUNTER — NUTRITION (OUTPATIENT)
Dept: SURGERY | Facility: CLINIC | Age: 64
End: 2024-05-29
Payer: COMMERCIAL

## 2024-05-29 VITALS
SYSTOLIC BLOOD PRESSURE: 140 MMHG | BODY MASS INDEX: 51.38 KG/M2 | WEIGHT: 290 LBS | HEART RATE: 90 BPM | HEIGHT: 63 IN | DIASTOLIC BLOOD PRESSURE: 87 MMHG | RESPIRATION RATE: 16 BRPM

## 2024-05-29 VITALS — BODY MASS INDEX: 52.2 KG/M2 | WEIGHT: 290 LBS

## 2024-05-29 DIAGNOSIS — M17.0 PRIMARY OSTEOARTHRITIS OF BOTH KNEES: ICD-10-CM

## 2024-05-29 DIAGNOSIS — E53.8 B12 DEFICIENCY: ICD-10-CM

## 2024-05-29 DIAGNOSIS — F33.0 MILD EPISODE OF RECURRENT MAJOR DEPRESSIVE DISORDER (CMS-HCC): ICD-10-CM

## 2024-05-29 DIAGNOSIS — E61.0 COPPER DEFICIENCY: ICD-10-CM

## 2024-05-29 DIAGNOSIS — G47.33 OSA (OBSTRUCTIVE SLEEP APNEA): Primary | ICD-10-CM

## 2024-05-29 DIAGNOSIS — D50.8 IRON DEFICIENCY ANEMIA SECONDARY TO INADEQUATE DIETARY IRON INTAKE: ICD-10-CM

## 2024-05-29 DIAGNOSIS — Z86.718 HISTORY OF DVT (DEEP VEIN THROMBOSIS): ICD-10-CM

## 2024-05-29 DIAGNOSIS — E55.9 VITAMIN D DEFICIENCY: ICD-10-CM

## 2024-05-29 DIAGNOSIS — R73.9 HYPERGLYCEMIA: ICD-10-CM

## 2024-05-29 DIAGNOSIS — E66.01 MORBID OBESITY WITH BMI OF 50.0-59.9, ADULT (MULTI): ICD-10-CM

## 2024-05-29 DIAGNOSIS — I10 BENIGN HYPERTENSION: ICD-10-CM

## 2024-05-29 DIAGNOSIS — E63.9 NUTRITIONAL DISORDER: ICD-10-CM

## 2024-05-29 DIAGNOSIS — K21.9 GASTROESOPHAGEAL REFLUX DISEASE WITHOUT ESOPHAGITIS: ICD-10-CM

## 2024-05-29 DIAGNOSIS — E51.9 THIAMINE DEFICIENCY: ICD-10-CM

## 2024-05-29 DIAGNOSIS — Z01.818 PRE-OP EVALUATION: ICD-10-CM

## 2024-05-29 PROCEDURE — 3079F DIAST BP 80-89 MM HG: CPT | Performed by: INTERNAL MEDICINE

## 2024-05-29 PROCEDURE — 3077F SYST BP >= 140 MM HG: CPT | Performed by: INTERNAL MEDICINE

## 2024-05-29 PROCEDURE — 3008F BODY MASS INDEX DOCD: CPT | Performed by: INTERNAL MEDICINE

## 2024-05-29 PROCEDURE — 93000 ELECTROCARDIOGRAM COMPLETE: CPT | Performed by: INTERNAL MEDICINE

## 2024-05-29 PROCEDURE — 99214 OFFICE O/P EST MOD 30 MIN: CPT | Performed by: INTERNAL MEDICINE

## 2024-05-29 RX ORDER — ACETAMINOPHEN, DEXTROMETHORPHAN HBR, DOXYLAMINE SUCCINATE, PHENYLEPHRINE HCL 650; 20; 12.5; 1 MG/30ML; MG/30ML; MG/30ML; MG/30ML
1 SOLUTION ORAL EVERY 24 HOURS
COMMUNITY

## 2024-05-29 ASSESSMENT — ENCOUNTER SYMPTOMS
DIFFICULTY URINATING: 0
NAUSEA: 1
BACK PAIN: 0
COUGH: 1
ABDOMINAL PAIN: 0
CONSTIPATION: 0
FEVER: 0
ROS GI COMMENTS: ACID REFLUX
HEADACHES: 0
ARTHRALGIAS: 1
CHILLS: 0
SHORTNESS OF BREATH: 1
DIARRHEA: 0
WEAKNESS: 0

## 2024-05-29 ASSESSMENT — PAIN SCALES - GENERAL: PAINLEVEL: 9

## 2024-05-29 NOTE — PROGRESS NOTES
"Subjective   Patient ID: Yazmin Slade is a 64 y.o. female who presents for [facility] visit 1. Patient has been trying to lose weight for many years by following different diets like Weight Watchers, lost some weight but did not maintain. Currently has 3 meals a day. Breakfast includes cheerios. Snacks on... . Drinks coffee. Regarding exercise, ... Takes Pepcid twice a day to control acid reflux and Celebrex for bilateral knee pain. Ambulates using cane. Her depression is under control. Reports restless legs. C/o cough, occ nausea, and shortness of breath with exertion. Has asthma and uses inhaler as needed. Uses CPAP (~4 years old) nightly. Hx of two DVTs of L leg, in 2018. Denies family hx of blood clots.    Diagnostics Reviewed: 5/29/2024 EKG NSR. 5/2024 RUQ US was nml.  Labs Reviewed: 3/2024 cholesterol nml, B12 nml, vit D nml, CMP nml, CBC nml, A1c 5.7, TSH 5.59.          Review of Systems   Constitutional:  Negative for chills and fever.   HENT:  Negative for dental problem.    Respiratory:  Positive for cough and shortness of breath.    Gastrointestinal:  Positive for nausea. Negative for abdominal pain, constipation and diarrhea.        Acid reflux   Genitourinary:  Negative for difficulty urinating.   Musculoskeletal:  Positive for arthralgias. Negative for back pain.   Neurological:  Negative for weakness and headaches.        Restless legs   Psychiatric/Behavioral:          Depression       Objective   /87   Pulse 90   Resp 16   Ht 1.588 m (5' 2.5\")   Wt 132 kg (290 lb)   BMI 52.20 kg/m²     Physical Exam  Constitutional:       General: She is not in acute distress.     Appearance: She is obese.   HENT:      Mouth/Throat:      Comments: Dentition WNL  Cardiovascular:      Rate and Rhythm: Normal rate and regular rhythm.      Heart sounds: Normal heart sounds.   Pulmonary:      Breath sounds: Normal breath sounds.   Abdominal:      Palpations: Abdomen is soft.      Tenderness: There is no abdominal " tenderness.   Musculoskeletal:      Cervical back: No tenderness.      Right lower le+ Edema present.      Left lower le+ Edema present.   Lymphadenopathy:      Cervical: No cervical adenopathy.   Skin:     General: Skin is warm.      Findings: No erythema.   Neurological:      Mental Status: She is alert and oriented to person, place, and time.      Gait: Gait is intact. Gait normal.   Psychiatric:         Mood and Affect: Mood normal.         Behavior: Behavior normal.         Assessment/Plan   Diagnoses and all orders for this visit:  LOYDA (obstructive sleep apnea)  -     aPTT; Future  -     Ferritin; Future  -     TSH with reflex to Free T4 if abnormal; Future  -     Protime-INR; Future  -     Parathyroid Hormone, Intact; Future  -     Vitamin B1, Whole Blood; Future  -     Copper, Blood; Future  -     Vitamin A; Future  -     Vitamin E; Future  -     Zinc, Serum or Plasma; Future  -     Iron and TIBC; Future  Pre-op evaluation  -     ECG 12 lead (Clinic Performed)  -     aPTT; Future  -     Ferritin; Future  -     TSH with reflex to Free T4 if abnormal; Future  -     Protime-INR; Future  -     Parathyroid Hormone, Intact; Future  -     Vitamin B1, Whole Blood; Future  -     Copper, Blood; Future  -     Vitamin A; Future  -     Vitamin E; Future  -     Zinc, Serum or Plasma; Future  -     Iron and TIBC; Future  Morbid obesity with BMI of 50.0-59.9, adult (Multi)  -     aPTT; Future  -     Ferritin; Future  -     TSH with reflex to Free T4 if abnormal; Future  -     Protime-INR; Future  -     Parathyroid Hormone, Intact; Future  -     Vitamin B1, Whole Blood; Future  -     Copper, Blood; Future  -     Vitamin A; Future  -     Vitamin E; Future  -     Zinc, Serum or Plasma; Future  -     Iron and TIBC; Future  Benign hypertension  -     aPTT; Future  -     Ferritin; Future  -     TSH with reflex to Free T4 if abnormal; Future  -     Protime-INR; Future  -     Parathyroid Hormone, Intact; Future  -      Vitamin B1, Whole Blood; Future  -     Copper, Blood; Future  -     Vitamin A; Future  -     Vitamin E; Future  -     Zinc, Serum or Plasma; Future  -     Iron and TIBC; Future  History of DVT (deep vein thrombosis)  -     aPTT; Future  -     Ferritin; Future  -     TSH with reflex to Free T4 if abnormal; Future  -     Protime-INR; Future  -     Parathyroid Hormone, Intact; Future  -     Vitamin B1, Whole Blood; Future  -     Copper, Blood; Future  -     Vitamin A; Future  -     Vitamin E; Future  -     Zinc, Serum or Plasma; Future  -     Iron and TIBC; Future  Gastroesophageal reflux disease without esophagitis  -     aPTT; Future  -     Ferritin; Future  -     TSH with reflex to Free T4 if abnormal; Future  -     Protime-INR; Future  -     Parathyroid Hormone, Intact; Future  -     Vitamin B1, Whole Blood; Future  -     Copper, Blood; Future  -     Vitamin A; Future  -     Vitamin E; Future  -     Zinc, Serum or Plasma; Future  -     Iron and TIBC; Future  Primary osteoarthritis of both knees  -     aPTT; Future  -     Ferritin; Future  -     TSH with reflex to Free T4 if abnormal; Future  -     Protime-INR; Future  -     Parathyroid Hormone, Intact; Future  -     Vitamin B1, Whole Blood; Future  -     Copper, Blood; Future  -     Vitamin A; Future  -     Vitamin E; Future  -     Zinc, Serum or Plasma; Future  -     Iron and TIBC; Future  Mild episode of recurrent major depressive disorder (CMS-HCC)  -     aPTT; Future  -     Ferritin; Future  -     TSH with reflex to Free T4 if abnormal; Future  -     Protime-INR; Future  -     Parathyroid Hormone, Intact; Future  -     Vitamin B1, Whole Blood; Future  -     Copper, Blood; Future  -     Vitamin A; Future  -     Vitamin E; Future  -     Zinc, Serum or Plasma; Future  -     Iron and TIBC; Future  Copper deficiency  -     aPTT; Future  -     Ferritin; Future  -     TSH with reflex to Free T4 if abnormal; Future  -     Protime-INR; Future  -     Parathyroid Hormone,  Intact; Future  -     Vitamin B1, Whole Blood; Future  -     Copper, Blood; Future  -     Vitamin A; Future  -     Vitamin E; Future  -     Zinc, Serum or Plasma; Future  -     Iron and TIBC; Future  Hyperglycemia  -     aPTT; Future  -     Ferritin; Future  -     TSH with reflex to Free T4 if abnormal; Future  -     Protime-INR; Future  -     Parathyroid Hormone, Intact; Future  -     Vitamin B1, Whole Blood; Future  -     Copper, Blood; Future  -     Vitamin A; Future  -     Vitamin E; Future  -     Zinc, Serum or Plasma; Future  -     Iron and TIBC; Future  B12 deficiency  -     aPTT; Future  -     Ferritin; Future  -     TSH with reflex to Free T4 if abnormal; Future  -     Protime-INR; Future  -     Parathyroid Hormone, Intact; Future  -     Vitamin B1, Whole Blood; Future  -     Copper, Blood; Future  -     Vitamin A; Future  -     Vitamin E; Future  -     Zinc, Serum or Plasma; Future  -     Iron and TIBC; Future  Iron deficiency anemia secondary to inadequate dietary iron intake  -     aPTT; Future  -     Ferritin; Future  -     TSH with reflex to Free T4 if abnormal; Future  -     Protime-INR; Future  -     Parathyroid Hormone, Intact; Future  -     Vitamin B1, Whole Blood; Future  -     Copper, Blood; Future  -     Vitamin A; Future  -     Vitamin E; Future  -     Zinc, Serum or Plasma; Future  -     Iron and TIBC; Future  Nutritional disorder  -     aPTT; Future  -     Ferritin; Future  -     TSH with reflex to Free T4 if abnormal; Future  -     Protime-INR; Future  -     Parathyroid Hormone, Intact; Future  -     Vitamin B1, Whole Blood; Future  -     Copper, Blood; Future  -     Vitamin A; Future  -     Vitamin E; Future  -     Zinc, Serum or Plasma; Future  -     Iron and TIBC; Future  Thiamine deficiency  -     aPTT; Future  -     Ferritin; Future  -     TSH with reflex to Free T4 if abnormal; Future  -     Protime-INR; Future  -     Parathyroid Hormone, Intact; Future  -     Vitamin B1, Whole Blood;  Future  -     Copper, Blood; Future  -     Vitamin A; Future  -     Vitamin E; Future  -     Zinc, Serum or Plasma; Future  -     Iron and TIBC; Future  Vitamin D deficiency  -     aPTT; Future  -     Ferritin; Future  -     TSH with reflex to Free T4 if abnormal; Future  -     Protime-INR; Future  -     Parathyroid Hormone, Intact; Future  -     Vitamin B1, Whole Blood; Future  -     Copper, Blood; Future  -     Vitamin A; Future  -     Vitamin E; Future  -     Zinc, Serum or Plasma; Future  -     Iron and TIBC; Future      Scribe Attestation  By signing my name below, Ivonne LUCIANO Scribe   attest that this documentation has been prepared under the direction and in the presence of Sara Vuong MD.

## 2024-06-13 ENCOUNTER — PATIENT MESSAGE (OUTPATIENT)
Dept: SURGERY | Facility: CLINIC | Age: 64
End: 2024-06-13
Payer: COMMERCIAL

## 2024-06-18 ENCOUNTER — APPOINTMENT (OUTPATIENT)
Dept: SURGERY | Facility: CLINIC | Age: 64
End: 2024-06-18
Payer: COMMERCIAL

## 2024-06-26 ENCOUNTER — APPOINTMENT (OUTPATIENT)
Dept: SURGERY | Facility: CLINIC | Age: 64
End: 2024-06-26
Payer: COMMERCIAL

## 2024-06-27 ENCOUNTER — APPOINTMENT (OUTPATIENT)
Dept: PRIMARY CARE | Facility: CLINIC | Age: 64
End: 2024-06-27
Payer: COMMERCIAL

## 2024-07-02 ENCOUNTER — APPOINTMENT (OUTPATIENT)
Dept: SURGERY | Facility: CLINIC | Age: 64
End: 2024-07-02
Payer: COMMERCIAL

## 2024-07-02 ENCOUNTER — APPOINTMENT (OUTPATIENT)
Dept: PRIMARY CARE | Facility: CLINIC | Age: 64
End: 2024-07-02
Payer: COMMERCIAL

## 2024-07-12 ENCOUNTER — APPOINTMENT (OUTPATIENT)
Dept: SURGERY | Facility: CLINIC | Age: 64
End: 2024-07-12
Payer: COMMERCIAL

## 2024-07-15 ENCOUNTER — APPOINTMENT (OUTPATIENT)
Dept: SURGERY | Facility: CLINIC | Age: 64
End: 2024-07-15
Payer: COMMERCIAL

## 2024-07-15 ENCOUNTER — DOCUMENTATION (OUTPATIENT)
Dept: PRIMARY CARE | Facility: CLINIC | Age: 64
End: 2024-07-15

## 2024-07-15 VITALS — WEIGHT: 285 LBS | BODY MASS INDEX: 51.3 KG/M2

## 2024-07-15 NOTE — PROGRESS NOTES
Medical Weight Loss Appointment ([facility] 2)    Starting Weight: 284 lbs  Current Weight: 285 lbs / This reflects a 5 lb wt loss x 2 months.   Current BMI: 51.30     Current Diet: following some of the lifelong rules.   Adherence: fair to good.   Daily Intake: 3 meals/day   Breakfast- mini peppers, mini cucumbers and tomatoes   Lunch (at 9:30 am) - 3 meatballs with a little sauce   Dinner (varies between 5-7pm)- a salad topped with chicken, OR a hamburger, OR pork and beans, OR 2 fried eggs and 1 piece of toast   Snacks: none.   Beverages: 1 cup of coffee/day with flavored creamer, water, 1 can of regular coke daily   Exercise: none.   Behavior/Diet Changes: Yazmin reports that she has reduced her portion sizes per meal by purchasing and using a smaller lunch box. She cut out snacking and eliminated the use of adding sugar packets in addition to flavored creamer in her coffee. She reports that she forgot to incorporate chair exercises as we dicussed in May.     Estimated ability to achieve goals: good.     Today's Lesson: Reviewed patient's dietary changes and food recall. Reviewed the importance and goal of high protein meals- more specifically at breakfast meal. Reviewed liquid calories and switching to coke zero as well as a sugar-free creamer. Provided a handout for healthy snack options and for high protein breakfast options. Provided a tentative meal schedule of:   Breakfast at 6:30 am.    Lunch at 10:30/11:30 am.    Dinner at 5-7 pm.    Possibly incorporating a high protein snack in between lunch/dinner if needed due to long gap.   Diet Goals: Practice the lifelong rules  Exercise Recommendations: Gradually work up to 150 minutes of moderate intensity exercise per week.  Behavior Goals:  1. Increase protein intake at breakfast meal.   2. Switch to coke zero.   3. Incorporate a chair exercise 1x/week.     Plan: Will follow up next month. Will continue to monitor pt's weight, dietary & behavior changes.        Gricel Fried RD, LDN  Registered Dietitian, Licensed Dietitian Nutritionist

## 2024-07-18 ENCOUNTER — TELEPHONE (OUTPATIENT)
Dept: PRIMARY CARE | Facility: CLINIC | Age: 64
End: 2024-07-18
Payer: COMMERCIAL

## 2024-07-18 DIAGNOSIS — J45.41 MODERATE PERSISTENT ASTHMA WITH ACUTE EXACERBATION (HHS-HCC): ICD-10-CM

## 2024-07-18 DIAGNOSIS — U07.1 COVID: Primary | ICD-10-CM

## 2024-07-18 RX ORDER — PREDNISONE 50 MG/1
50 TABLET ORAL DAILY
Qty: 5 TABLET | Refills: 0 | Status: SHIPPED | OUTPATIENT
Start: 2024-07-18 | End: 2024-07-23

## 2024-07-18 RX ORDER — DOXYCYCLINE 100 MG/1
100 CAPSULE ORAL 2 TIMES DAILY
Qty: 14 CAPSULE | Refills: 0 | Status: SHIPPED | OUTPATIENT
Start: 2024-07-18 | End: 2024-07-25

## 2024-07-18 NOTE — TELEPHONE ENCOUNTER
Sent doxycycline and prednisone.  She cannot take Paxlovid due to being on Eliquis.  Make sure to isolate with a mask for 5 days or until your symptoms improve.  Then can be in public with mask

## 2024-07-18 NOTE — TELEPHONE ENCOUNTER
Call to pt made. I left detailed message to pt going over providers message. No need to call the office back unless she had questions.

## 2024-07-18 NOTE — TELEPHONE ENCOUNTER
Pt called office and stated that she is covid positive as of today. She said she started feeling sick last night. She has chills, drainage, a cough and she is nauseous. She is wondering what she can do for this? She states she has asthma and is concerned about this. [facility] uses [facility] in [place].

## 2024-07-23 DIAGNOSIS — F41.9 ANXIETY: ICD-10-CM

## 2024-07-23 DIAGNOSIS — I10 BENIGN HYPERTENSION: Primary | ICD-10-CM

## 2024-07-24 ENCOUNTER — APPOINTMENT (OUTPATIENT)
Dept: SURGERY | Facility: CLINIC | Age: 64
End: 2024-07-24
Payer: COMMERCIAL

## 2024-07-29 RX ORDER — LOSARTAN POTASSIUM AND HYDROCHLOROTHIAZIDE 25; 100 MG/1; MG/1
1 TABLET ORAL DAILY
Qty: 90 TABLET | Refills: 3 | Status: SHIPPED | OUTPATIENT
Start: 2024-07-29

## 2024-07-29 RX ORDER — DULOXETIN HYDROCHLORIDE 30 MG/1
30 CAPSULE, DELAYED RELEASE ORAL 2 TIMES DAILY
Qty: 180 CAPSULE | Refills: 3 | Status: SHIPPED | OUTPATIENT
Start: 2024-07-29

## 2024-07-30 ENCOUNTER — APPOINTMENT (OUTPATIENT)
Dept: PRIMARY CARE | Facility: CLINIC | Age: 64
End: 2024-07-30
Payer: COMMERCIAL

## 2024-07-30 VITALS
HEART RATE: 110 BPM | DIASTOLIC BLOOD PRESSURE: 82 MMHG | WEIGHT: 283.6 LBS | HEIGHT: 62 IN | BODY MASS INDEX: 52.19 KG/M2 | TEMPERATURE: 98.2 F | SYSTOLIC BLOOD PRESSURE: 138 MMHG | OXYGEN SATURATION: 96 %

## 2024-07-30 DIAGNOSIS — I10 BENIGN HYPERTENSION: ICD-10-CM

## 2024-07-30 DIAGNOSIS — J45.31 MILD PERSISTENT ASTHMA WITH ACUTE EXACERBATION (HHS-HCC): ICD-10-CM

## 2024-07-30 DIAGNOSIS — J40 BRONCHITIS: Primary | ICD-10-CM

## 2024-07-30 PROCEDURE — 99214 OFFICE O/P EST MOD 30 MIN: CPT | Performed by: FAMILY MEDICINE

## 2024-07-30 PROCEDURE — 3075F SYST BP GE 130 - 139MM HG: CPT | Performed by: FAMILY MEDICINE

## 2024-07-30 PROCEDURE — 3079F DIAST BP 80-89 MM HG: CPT | Performed by: FAMILY MEDICINE

## 2024-07-30 PROCEDURE — 1036F TOBACCO NON-USER: CPT | Performed by: FAMILY MEDICINE

## 2024-07-30 PROCEDURE — 3008F BODY MASS INDEX DOCD: CPT | Performed by: FAMILY MEDICINE

## 2024-07-30 RX ORDER — AZITHROMYCIN 250 MG/1
TABLET, FILM COATED ORAL
Qty: 6 TABLET | Refills: 1 | Status: SHIPPED | OUTPATIENT
Start: 2024-07-30 | End: 2024-08-04

## 2024-07-30 ASSESSMENT — LIFESTYLE VARIABLES: HOW OFTEN DO YOU HAVE A DRINK CONTAINING ALCOHOL: NEVER

## 2024-07-30 ASSESSMENT — ENCOUNTER SYMPTOMS
WHEEZING: 0
CONSTIPATION: 0
SHORTNESS OF BREATH: 0
COUGH: 0
DYSURIA: 0
JOINT SWELLING: 1
DIZZINESS: 0
FREQUENCY: 0
ABDOMINAL PAIN: 0
PALPITATIONS: 0
FEVER: 0
HEMATURIA: 0
CHILLS: 0
FATIGUE: 0
BRUISES/BLEEDS EASILY: 0
VOMITING: 0
DIARRHEA: 0
ARTHRALGIAS: 1
NERVOUS/ANXIOUS: 0
SORE THROAT: 0
NAUSEA: 0
DYSPHORIC MOOD: 0
HEADACHES: 0

## 2024-07-30 ASSESSMENT — PATIENT HEALTH QUESTIONNAIRE - PHQ9
1. LITTLE INTEREST OR PLEASURE IN DOING THINGS: NOT AT ALL
2. FEELING DOWN, DEPRESSED OR HOPELESS: NOT AT ALL
SUM OF ALL RESPONSES TO PHQ9 QUESTIONS 1 AND 2: 0

## 2024-07-30 ASSESSMENT — PAIN SCALES - GENERAL: PAINLEVEL: 10-WORST PAIN EVER

## 2024-07-30 NOTE — PROGRESS NOTES
"Subjective   Patient ID: Yazmin Slade is a 64 y.o. female who presents for Follow-up (Medications, bariatric surgery.).    HPI here for wt loss, also has uri sx for 10 days and not betting better with sinus headache and cough worse with her history of asthma. Watching diet and has lost 22 lbs. She sees nutritionist. Sees bariatric surgery regularly as well.  Hoping fir surgery later this year. Has labs ordered to be done before that visit.     Review of Systems   Constitutional:  Negative for chills, fatigue and fever.   HENT:  Negative for congestion, ear pain, hearing loss, postnasal drip, sore throat and tinnitus.    Eyes:  Negative for visual disturbance.   Respiratory:  Negative for cough, shortness of breath and wheezing.    Cardiovascular:  Negative for chest pain and palpitations.   Gastrointestinal:  Negative for abdominal pain, constipation, diarrhea, nausea and vomiting.   Endocrine: Negative for polyuria.   Genitourinary:  Negative for dysuria, frequency, hematuria and urgency.   Musculoskeletal:  Positive for arthralgias and joint swelling. Negative for gait problem.   Skin:  Negative for rash.   Neurological:  Negative for dizziness, syncope and headaches.   Hematological:  Does not bruise/bleed easily.   Psychiatric/Behavioral:  Negative for dysphoric mood. The patient is not nervous/anxious.        Objective   /82   Pulse 110   Temp 36.8 °C (98.2 °F)   Ht 1.575 m (5' 2\")   Wt 129 kg (283 lb 9.6 oz)   SpO2 96%   BMI 51.87 kg/m²     Physical Exam  Vitals reviewed.   Constitutional:       General: She is not in acute distress.     Appearance: Normal appearance.   HENT:      Head: Normocephalic.      Right Ear: Tympanic membrane, ear canal and external ear normal.      Left Ear: Ear canal and external ear normal.      Ears:      Comments: Left tm red     Nose: Nose normal.      Mouth/Throat:      Mouth: Mucous membranes are moist.      Pharynx: Oropharynx is clear. No oropharyngeal exudate. "   Eyes:      Extraocular Movements: Extraocular movements intact.      Conjunctiva/sclera: Conjunctivae normal.      Pupils: Pupils are equal, round, and reactive to light.   Cardiovascular:      Rate and Rhythm: Normal rate and regular rhythm.      Pulses: Normal pulses.      Heart sounds: No murmur heard.  Pulmonary:      Effort: Pulmonary effort is normal.      Breath sounds: Wheezing present. No rhonchi.   Abdominal:      General: Bowel sounds are normal. There is no distension.      Palpations: Abdomen is soft.      Tenderness: There is no abdominal tenderness. There is no rebound.   Musculoskeletal:      Right lower leg: No edema.      Left lower leg: No edema.   Lymphadenopathy:      Cervical: Cervical adenopathy present.   Skin:     General: Skin is warm.      Coloration: Skin is not jaundiced.   Neurological:      General: No focal deficit present.      Mental Status: She is alert and oriented to person, place, and time.      Cranial Nerves: No cranial nerve deficit.      Gait: Gait normal.   Psychiatric:         Mood and Affect: Mood normal.         Assessment/Plan   Problem List Items Addressed This Visit             ICD-10-CM    Benign hypertension I10     Other Visit Diagnoses         Codes    Bronchitis    -  Primary J40    Relevant Medications    azithromycin (Zithromax) 250 mg tablet    Mild persistent asthma with acute exacerbation ([facility])     J45.31          Call if no better or worse, has refill if needed

## 2024-08-14 ENCOUNTER — APPOINTMENT (OUTPATIENT)
Dept: SURGERY | Facility: CLINIC | Age: 64
End: 2024-08-14
Payer: COMMERCIAL

## 2024-08-14 VITALS
WEIGHT: 280 LBS | HEART RATE: 84 BPM | DIASTOLIC BLOOD PRESSURE: 87 MMHG | SYSTOLIC BLOOD PRESSURE: 142 MMHG | HEIGHT: 62 IN | BODY MASS INDEX: 51.53 KG/M2 | RESPIRATION RATE: 16 BRPM

## 2024-08-14 DIAGNOSIS — I10 BENIGN HYPERTENSION: ICD-10-CM

## 2024-08-14 DIAGNOSIS — G47.33 OSA (OBSTRUCTIVE SLEEP APNEA): ICD-10-CM

## 2024-08-14 DIAGNOSIS — K21.9 GASTROESOPHAGEAL REFLUX DISEASE WITHOUT ESOPHAGITIS: Primary | ICD-10-CM

## 2024-08-14 DIAGNOSIS — E66.01 MORBID OBESITY WITH BMI OF 50.0-59.9, ADULT (MULTI): ICD-10-CM

## 2024-08-14 DIAGNOSIS — M17.0 PRIMARY OSTEOARTHRITIS OF BOTH KNEES: ICD-10-CM

## 2024-08-14 PROCEDURE — 3077F SYST BP >= 140 MM HG: CPT | Performed by: INTERNAL MEDICINE

## 2024-08-14 PROCEDURE — 3079F DIAST BP 80-89 MM HG: CPT | Performed by: INTERNAL MEDICINE

## 2024-08-14 PROCEDURE — 3008F BODY MASS INDEX DOCD: CPT | Performed by: INTERNAL MEDICINE

## 2024-08-14 PROCEDURE — 99213 OFFICE O/P EST LOW 20 MIN: CPT | Performed by: INTERNAL MEDICINE

## 2024-08-14 ASSESSMENT — ENCOUNTER SYMPTOMS
FEVER: 0
BACK PAIN: 0
OCCASIONAL FEELINGS OF UNSTEADINESS: 0
SHORTNESS OF BREATH: 1
DEPRESSION: 0
DIARRHEA: 0
COUGH: 1
DIFFICULTY URINATING: 0
HEADACHES: 0
LOSS OF SENSATION IN FEET: 0
ARTHRALGIAS: 1
WEAKNESS: 0
CONSTIPATION: 0
ABDOMINAL PAIN: 0
NAUSEA: 1
ROS GI COMMENTS: ACID REFLUX
CHILLS: 0

## 2024-08-14 ASSESSMENT — PAIN SCALES - GENERAL: PAINLEVEL: 9

## 2024-08-14 NOTE — PROGRESS NOTES
"Subjective   Patient ID: Yazmin Slade is a 64 y.o. female who presents for [facility] visit 2. Currently has 2-3 meals a day. Breakfast often is just coffee. Does not snack. Drinks coffee and Coke zero. She cut down from a case of Coke per week to a 6pack of Coke zero per week. Regarding exercise, she walks after meals. She will start counting calories. Takes Pepcid twice a day to control acid reflux and Celebrex for bilateral knee pain. Ambulates using cane. Her depression is under control. Reports restless legs. C/o cough, occ nausea, and shortness of breath with exertion. Has asthma and uses inhaler as needed. Uses CPAP (~4 years old) nightly. Hx of two DVTs of L leg, in 2018. Denies family hx of blood clots.    Diagnostics Reviewed: 5/29/2024 EKG NSR. 5/2024 RUQ US was nml.  Labs Reviewed: 3/2024 cholesterol nml, B12 nml, vit D nml, CMP nml, CBC nml, A1c 5.7, TSH 5.59.         Review of Systems   Constitutional:  Negative for chills and fever.   HENT:  Negative for dental problem.    Respiratory:  Positive for cough and shortness of breath.    Gastrointestinal:  Positive for nausea. Negative for abdominal pain, constipation and diarrhea.        Acid reflux   Genitourinary:  Negative for difficulty urinating.   Musculoskeletal:  Positive for arthralgias. Negative for back pain.   Neurological:  Negative for weakness and headaches.        Restless legs   Psychiatric/Behavioral:          Depression       Objective   /87   Pulse 84   Resp 16   Ht 1.575 m (5' 2\")   Wt 127 kg (280 lb)   BMI 51.21 kg/m²     Physical Exam  Constitutional:       General: She is not in acute distress.     Appearance: She is obese.   HENT:      Mouth/Throat:      Comments: Dentition WNL  Cardiovascular:      Rate and Rhythm: Normal rate and regular rhythm.      Heart sounds: Normal heart sounds.   Pulmonary:      Breath sounds: Normal breath sounds.   Abdominal:      Palpations: Abdomen is soft.      Tenderness: There is no abdominal " tenderness.   Musculoskeletal:      Cervical back: No tenderness.      Right lower le+ Edema present.      Left lower le+ Edema present.   Lymphadenopathy:      Cervical: No cervical adenopathy.   Skin:     General: Skin is warm.      Findings: No erythema.   Neurological:      Mental Status: She is alert and oriented to person, place, and time.      Gait: Gait is intact. Gait normal.   Psychiatric:         Mood and Affect: Mood normal.         Behavior: Behavior normal.         Assessment/Plan   Diagnoses and all orders for this visit:  Gastroesophageal reflux disease without esophagitis  Morbid obesity with BMI of 50.0-59.9, adult (Multi)  LOYDA (obstructive sleep apnea)  Benign hypertension  Primary osteoarthritis of both knees        Scribe Attestation  By signing my name below, ICarlie, Scribe   attest that this documentation has been prepared under the direction and in the presence of Sara Vuong MD.

## 2024-08-14 NOTE — PROGRESS NOTES
Medical Weight Loss Appointment (M[facility] 3)    Starting Weight with dietitian on 5/29: 290 lbs   Current Weight: 280 lbs / 5 lb weight loss from last appointment   Current BMI: 51.21     Current Diet: practice lifelong rules   Adherence: good  Daily Intake: 3 meals per day  Breakfast- Dannon or Yoplait yogurt or a hard boiled egg and a coffee  Lunch- Celery and carrots with leftovers from the night before   Dinner- Yesterday had 1 piece of salmon and 1 tbsp. cranberry sauce   Snacks: None  Beverages: plain water with lemon, 2 cans of zero sugar pop  Exercise: Started walking 2 weeks ago. Walking 1-2 laps around her building  every other day.    Behavior/Diet Changes:  - reduced creamer in coffee  - stopped drinking regular pop and sweet tea. Drinking mainly water.   - stopped snacking   - started walking every other day     Estimated ability to achieve goals: good     Today's Lesson: Reviewed patient's dietary changes and food recall  - Recommended that Yazmin try Greek yogurt instead of regular yogurt, as it is higher In protein. Encouraged pt to consume 20-30 grams of protein at each meal.   - Informed pt to continue practicing the lifelong rules before surgery and the importance of maintaining weight loss. Continued weight loss of 1-2# per week desirable.   - Reminded Yazmin to schedule her psychological eval     Diet Goals: Practice the lifelong rules  Exercise Recommendations: Gradually work up to 150 minutes of moderate intensity exercise per week.  Goals for the month:  Schedule psych eval   2.   Continue to drink water and sugar free beverages only   3.   Increase protein at breakfast   4.   Continue to walk every other day     Plan: Dietary clearance was provided today. Recommended weight checks until surgery.     Xuan Lemus, MS, RD, LD

## 2024-10-09 DIAGNOSIS — J45.20 MILD INTERMITTENT ASTHMA, UNSPECIFIED WHETHER COMPLICATED (HHS-HCC): ICD-10-CM

## 2024-10-09 DIAGNOSIS — I10 BENIGN HYPERTENSION: ICD-10-CM

## 2024-10-09 RX ORDER — ALBUTEROL SULFATE 90 UG/1
2 INHALANT RESPIRATORY (INHALATION) EVERY 4 HOURS PRN
Qty: 25.5 G | Refills: 3 | Status: SHIPPED | OUTPATIENT
Start: 2024-10-09

## 2024-10-09 RX ORDER — FUROSEMIDE 20 MG/1
20 TABLET ORAL DAILY PRN
Qty: 90 TABLET | Refills: 3 | Status: SHIPPED | OUTPATIENT
Start: 2024-10-09

## 2024-10-09 NOTE — TELEPHONE ENCOUNTER
Pharmacy request.Last ov 07-. Next ov 01-.  
Await medical evaluation with pcp due to elevated BP at [facility] -- notified surgeon via email  * Await echo done at [facility]

## 2024-10-21 DIAGNOSIS — E55.9 VITAMIN D DEFICIENCY: Primary | ICD-10-CM

## 2024-10-21 RX ORDER — ERGOCALCIFEROL 1.25 MG/1
CAPSULE ORAL
Qty: 26 CAPSULE | Refills: 3 | Status: SHIPPED | OUTPATIENT
Start: 2024-10-21

## 2025-01-30 ENCOUNTER — APPOINTMENT (OUTPATIENT)
Dept: PRIMARY CARE | Facility: CLINIC | Age: 65
End: 2025-01-30
Payer: COMMERCIAL

## 2025-01-30 ENCOUNTER — DOCUMENTATION (OUTPATIENT)
Dept: SURGERY | Facility: CLINIC | Age: 65
End: 2025-01-30

## 2025-02-11 ENCOUNTER — TELEPHONE (OUTPATIENT)
Dept: SURGERY | Facility: CLINIC | Age: 65
End: 2025-02-11
Payer: COMMERCIAL